# Patient Record
Sex: FEMALE | Race: ASIAN | URBAN - METROPOLITAN AREA
[De-identification: names, ages, dates, MRNs, and addresses within clinical notes are randomized per-mention and may not be internally consistent; named-entity substitution may affect disease eponyms.]

---

## 2020-01-22 ENCOUNTER — NEW PATIENT (OUTPATIENT)
Dept: URBAN - METROPOLITAN AREA CLINIC 30 | Facility: CLINIC | Age: 70
End: 2020-01-22
Payer: MEDICARE

## 2020-01-22 DIAGNOSIS — H04.123 TEAR FILM INSUFFICIENCY OF BILATERAL LACRIMAL GLANDS: ICD-10-CM

## 2020-01-22 DIAGNOSIS — H25.813 COMBINED FORMS OF AGE-RELATED CATARACT, BILATERAL: ICD-10-CM

## 2020-01-22 PROCEDURE — 92134 CPTRZ OPH DX IMG PST SGM RTA: CPT | Performed by: OPTOMETRIST

## 2020-01-22 PROCEDURE — 92004 COMPRE OPH EXAM NEW PT 1/>: CPT | Performed by: OPTOMETRIST

## 2020-01-22 ASSESSMENT — INTRAOCULAR PRESSURE
OD: 22
OS: 22

## 2020-01-22 ASSESSMENT — VISUAL ACUITY
OS: 20/40
OD: 20/40

## 2020-01-22 ASSESSMENT — KERATOMETRY
OS: 42.43
OD: 42.80

## 2020-03-02 ENCOUNTER — FOLLOW UP ESTABLISHED (OUTPATIENT)
Dept: URBAN - METROPOLITAN AREA CLINIC 30 | Facility: CLINIC | Age: 70
End: 2020-03-02
Payer: MEDICARE

## 2020-03-02 PROCEDURE — 99213 OFFICE O/P EST LOW 20 MIN: CPT | Performed by: OPTOMETRIST

## 2020-03-02 PROCEDURE — 76514 ECHO EXAM OF EYE THICKNESS: CPT | Performed by: OPTOMETRIST

## 2020-03-02 PROCEDURE — 92083 EXTENDED VISUAL FIELD XM: CPT | Performed by: OPTOMETRIST

## 2020-03-02 PROCEDURE — 92133 CPTRZD OPH DX IMG PST SGM ON: CPT | Performed by: OPTOMETRIST

## 2020-03-02 ASSESSMENT — VISUAL ACUITY
OD: 20/40
OS: 20/30

## 2020-03-02 ASSESSMENT — INTRAOCULAR PRESSURE
OD: 18
OS: 17

## 2021-08-26 ENCOUNTER — OFFICE VISIT (OUTPATIENT)
Dept: URBAN - METROPOLITAN AREA CLINIC 30 | Facility: CLINIC | Age: 71
End: 2021-08-26
Payer: MEDICARE

## 2021-08-26 DIAGNOSIS — E11.9 TYPE 2 DIABETES MELLITUS WITHOUT COMPLICATIONS: Primary | ICD-10-CM

## 2021-08-26 DIAGNOSIS — H40.023 OPEN ANGLE WITH BORDERLINE FINDINGS, HIGH RISK, BILATERAL: ICD-10-CM

## 2021-08-26 PROCEDURE — 92133 CPTRZD OPH DX IMG PST SGM ON: CPT | Performed by: OPTOMETRIST

## 2021-08-26 PROCEDURE — 92134 CPTRZ OPH DX IMG PST SGM RTA: CPT | Performed by: OPTOMETRIST

## 2021-08-26 PROCEDURE — 92014 COMPRE OPH EXAM EST PT 1/>: CPT | Performed by: OPTOMETRIST

## 2021-08-26 ASSESSMENT — VISUAL ACUITY
OS: 20/25
OD: 20/30

## 2021-08-26 ASSESSMENT — INTRAOCULAR PRESSURE
OS: 20
OD: 20

## 2021-08-26 ASSESSMENT — KERATOMETRY
OS: 42.86
OD: 23.02

## 2021-08-26 NOTE — IMPRESSION/PLAN
Impression: Combined forms of age-related cataract, bilateral Plan: Appears stable. CTM for now. New spec Rx today.

## 2021-08-26 NOTE — IMPRESSION/PLAN
Impression: Type 2 diabetes mellitus without complications Plan: No retinopathy on exam today. Patient educated on importance of BG control. Monitor annually. Diet controlled.

## 2021-08-26 NOTE — IMPRESSION/PLAN
Impression: Open angle with borderline findings, high risk, bilateral
-FHx PACHS 533/539 Plan: Due to mild cupping. IOP a little higher today, 20 OU s tx. RNFL 3/2020 normal NFL OU (88/82); RNFL 8/21 essentially stable. VF 3/2020 essentially full OU c low test reliability. Monitor s tx at this time. Repeat VF in future.

## 2021-08-26 NOTE — IMPRESSION/PLAN
Impression: Tear film insufficiency of bilateral lacrimal glands Plan: Pt notes intermittent blurring of vision. Pt does not use ATs consistently noting she forgets.

## 2022-02-24 ENCOUNTER — OFFICE VISIT (OUTPATIENT)
Dept: URBAN - METROPOLITAN AREA CLINIC 30 | Facility: CLINIC | Age: 72
End: 2022-02-24
Payer: MEDICARE

## 2022-02-24 PROCEDURE — 92083 EXTENDED VISUAL FIELD XM: CPT | Performed by: OPTOMETRIST

## 2022-02-24 PROCEDURE — 99213 OFFICE O/P EST LOW 20 MIN: CPT | Performed by: OPTOMETRIST

## 2022-02-24 ASSESSMENT — INTRAOCULAR PRESSURE
OS: 20
OD: 20

## 2022-02-24 NOTE — IMPRESSION/PLAN
Impression: Open angle with borderline findings, high risk, bilateral
-FHx PACHS 533/539 Plan: Due to mild cupping. IOPs stable, 20 OU s tx. RNFL 3/2020 normal NFL OU (88/82); RNFL 8/21 essentially stable. VF 3/2020 essentially full OU c low test reliability. VF 2/2022: unreliable OU/full. VFs have been unreliable, IOP stable and normal OCT, monitor s tx. Consider annual monitoring after next visit.

## 2022-08-24 ENCOUNTER — OFFICE VISIT (OUTPATIENT)
Dept: URBAN - METROPOLITAN AREA CLINIC 30 | Facility: CLINIC | Age: 72
End: 2022-08-24
Payer: MEDICARE

## 2022-08-24 DIAGNOSIS — H40.023 OPEN ANGLE WITH BORDERLINE FINDINGS, HIGH RISK, BILATERAL: ICD-10-CM

## 2022-08-24 DIAGNOSIS — E11.9 TYPE 2 DIABETES MELLITUS WITHOUT COMPLICATIONS: Primary | ICD-10-CM

## 2022-08-24 DIAGNOSIS — H04.123 DRY EYE SYNDROME OF BILATERAL LACRIMAL GLANDS: ICD-10-CM

## 2022-08-24 DIAGNOSIS — Z79.84 LONG TERM USE OF ORAL ANTIDIABETIC DRUG: ICD-10-CM

## 2022-08-24 DIAGNOSIS — H25.813 COMBINED FORMS OF AGE-RELATED CATARACT, BILATERAL: ICD-10-CM

## 2022-08-24 PROCEDURE — 92134 CPTRZ OPH DX IMG PST SGM RTA: CPT | Performed by: OPTOMETRIST

## 2022-08-24 PROCEDURE — 92133 CPTRZD OPH DX IMG PST SGM ON: CPT | Performed by: OPTOMETRIST

## 2022-08-24 PROCEDURE — 92014 COMPRE OPH EXAM EST PT 1/>: CPT | Performed by: OPTOMETRIST

## 2022-08-24 ASSESSMENT — VISUAL ACUITY
OD: 20/40
OS: 20/40

## 2022-08-24 ASSESSMENT — INTRAOCULAR PRESSURE
OD: 17
OS: 19

## 2022-08-24 NOTE — IMPRESSION/PLAN
Impression: Open angle with borderline findings, high risk, bilateral
-FHx PACHS 533/539 Plan: Due to mild cupping. IOPs today, 17,19) s tx. RNFL 3/2020 normal NFL OU (88/82); RNFL 8/21 essentially stable. RNFL 8/2022: normal NFL OU, appears stable. VF 3/2020 essentially full OU c low test reliability. VF 2/2022: unreliable OU/full. VFs have been unreliable, IOP stable and normal OCT, monitor s tx. Plan  annual monitoring at this point.

## 2022-08-24 NOTE — IMPRESSION/PLAN
Impression: Tear film insufficiency of bilateral lacrimal glands Plan: Patient notes she is not using ATs consistently due to forgetting. Pt intermittent blurring of visions especially when looking at her cell phone. Discussed blink breaks during screen time and use ATs consistently QAM increasing throughout the day up to QID OU prn.

## 2022-08-24 NOTE — IMPRESSION/PLAN
Impression: Combined forms of age-related cataract, bilateral Plan: Discussed cataracts with patient. Discussed treatment options. Surgical treatment is recommended. Pt defers surgery at this time due to other health issues, needs sinus surgery.

## 2023-01-31 ENCOUNTER — OFFICE VISIT (OUTPATIENT)
Dept: URBAN - METROPOLITAN AREA CLINIC 30 | Facility: CLINIC | Age: 73
End: 2023-01-31
Payer: MEDICARE

## 2023-01-31 DIAGNOSIS — H02.821 CYSTS OF RIGHT UPPER EYELID: Primary | ICD-10-CM

## 2023-01-31 DIAGNOSIS — H25.813 COMBINED FORMS OF AGE-RELATED CATARACT, BILATERAL: ICD-10-CM

## 2023-01-31 PROCEDURE — 99213 OFFICE O/P EST LOW 20 MIN: CPT | Performed by: OPTOMETRIST

## 2023-01-31 ASSESSMENT — INTRAOCULAR PRESSURE
OS: 20
OD: 18

## 2023-01-31 NOTE — IMPRESSION/PLAN
Impression: Cysts of right upper eyelid: H02.821. Plan: Appears to be a small internal cyst RUL (palpable mass, not visible on exam), everted UL- no FB and does not appear to be hordeola or chalazia. Rec try WCP and gentle lid massage, if lesion enlarges may need biopsy. No pain. Call if worsens.

## 2023-01-31 NOTE — IMPRESSION/PLAN
Impression: Combined forms of age-related cataract, bilateral Plan: Discussed cataracts with patient. Pt still defers surgery at this time due to other health issues.

## 2023-08-24 ENCOUNTER — OFFICE VISIT (OUTPATIENT)
Dept: URBAN - METROPOLITAN AREA CLINIC 30 | Facility: CLINIC | Age: 73
End: 2023-08-24
Payer: MEDICARE

## 2023-08-24 DIAGNOSIS — H25.813 COMBINED FORMS OF AGE-RELATED CATARACT, BILATERAL: ICD-10-CM

## 2023-08-24 DIAGNOSIS — E11.9 TYPE 2 DIABETES MELLITUS WITHOUT COMPLICATIONS: Primary | ICD-10-CM

## 2023-08-24 DIAGNOSIS — H11.153 PINGUECULA, BILATERAL: ICD-10-CM

## 2023-08-24 DIAGNOSIS — Z79.84 LONG TERM USE OF ORAL ANTIDIABETIC DRUG: ICD-10-CM

## 2023-08-24 DIAGNOSIS — H40.023 OPEN ANGLE WITH BORDERLINE FINDINGS, HIGH RISK, BILATERAL: ICD-10-CM

## 2023-08-24 DIAGNOSIS — H04.123 DRY EYE SYNDROME OF BILATERAL LACRIMAL GLANDS: ICD-10-CM

## 2023-08-24 PROCEDURE — 92014 COMPRE OPH EXAM EST PT 1/>: CPT | Performed by: OPTOMETRIST

## 2023-08-24 PROCEDURE — 92133 CPTRZD OPH DX IMG PST SGM ON: CPT | Performed by: OPTOMETRIST

## 2023-08-24 ASSESSMENT — INTRAOCULAR PRESSURE
OS: 23
OD: 21

## 2023-08-24 ASSESSMENT — KERATOMETRY
OD: 42.83
OS: 42.75

## 2023-08-24 ASSESSMENT — VISUAL ACUITY
OD: 20/30
OS: 20/30

## 2023-12-22 ENCOUNTER — OFFICE VISIT (OUTPATIENT)
Dept: URBAN - METROPOLITAN AREA CLINIC 30 | Facility: CLINIC | Age: 73
End: 2023-12-22
Payer: MEDICARE

## 2023-12-22 DIAGNOSIS — H40.023 OPEN ANGLE WITH BORDERLINE FINDINGS, HIGH RISK, BILATERAL: Primary | ICD-10-CM

## 2023-12-22 PROCEDURE — 99212 OFFICE O/P EST SF 10 MIN: CPT | Performed by: OPTOMETRIST

## 2023-12-22 PROCEDURE — 92083 EXTENDED VISUAL FIELD XM: CPT | Performed by: OPTOMETRIST

## 2023-12-22 ASSESSMENT — INTRAOCULAR PRESSURE
OD: 21
OS: 20

## 2024-08-23 ENCOUNTER — OFFICE VISIT (OUTPATIENT)
Dept: URBAN - METROPOLITAN AREA CLINIC 30 | Facility: CLINIC | Age: 74
End: 2024-08-23
Payer: MEDICARE

## 2024-08-23 DIAGNOSIS — Z79.84 LONG TERM USE OF ORAL ANTIDIABETIC DRUG: ICD-10-CM

## 2024-08-23 DIAGNOSIS — H40.023 OPEN ANGLE WITH BORDERLINE FINDINGS, HIGH RISK, BILATERAL: ICD-10-CM

## 2024-08-23 DIAGNOSIS — H04.123 DRY EYE SYNDROME OF BILATERAL LACRIMAL GLANDS: ICD-10-CM

## 2024-08-23 DIAGNOSIS — E11.9 TYPE 2 DIABETES MELLITUS WITHOUT COMPLICATIONS: Primary | ICD-10-CM

## 2024-08-23 DIAGNOSIS — H25.813 COMBINED FORMS OF AGE-RELATED CATARACT, BILATERAL: ICD-10-CM

## 2024-08-23 PROCEDURE — 92014 COMPRE OPH EXAM EST PT 1/>: CPT | Performed by: OPTOMETRIST

## 2024-08-23 PROCEDURE — 92134 CPTRZ OPH DX IMG PST SGM RTA: CPT | Performed by: OPTOMETRIST

## 2024-08-23 PROCEDURE — 92133 CPTRZD OPH DX IMG PST SGM ON: CPT | Performed by: OPTOMETRIST

## 2024-08-23 ASSESSMENT — KERATOMETRY
OD: 42.97
OS: 42.70

## 2024-08-23 ASSESSMENT — VISUAL ACUITY
OS: 20/40
OD: 20/30

## 2024-08-23 ASSESSMENT — INTRAOCULAR PRESSURE
OD: 21
OS: 20

## 2025-08-25 ENCOUNTER — OFFICE VISIT (OUTPATIENT)
Dept: URBAN - METROPOLITAN AREA CLINIC 30 | Facility: CLINIC | Age: 75
End: 2025-08-25
Payer: MEDICARE

## 2025-08-25 DIAGNOSIS — H43.811 VITREOUS DEGENERATION, RIGHT EYE: ICD-10-CM

## 2025-08-25 DIAGNOSIS — H25.813 COMBINED FORMS OF AGE-RELATED CATARACT, BILATERAL: ICD-10-CM

## 2025-08-25 DIAGNOSIS — E11.9 TYPE 2 DIABETES MELLITUS WITHOUT COMPLICATIONS: Primary | ICD-10-CM

## 2025-08-25 DIAGNOSIS — H40.023 OPEN ANGLE WITH BORDERLINE FINDINGS, HIGH RISK, BILATERAL: ICD-10-CM

## 2025-08-25 DIAGNOSIS — Z79.84 LONG TERM USE OF ORAL ANTIDIABETIC DRUG: ICD-10-CM

## 2025-08-25 PROCEDURE — 92014 COMPRE OPH EXAM EST PT 1/>: CPT | Performed by: OPTOMETRIST

## 2025-08-25 PROCEDURE — 92134 CPTRZ OPH DX IMG PST SGM RTA: CPT | Performed by: OPTOMETRIST

## 2025-08-25 PROCEDURE — 92133 CPTRZD OPH DX IMG PST SGM ON: CPT | Performed by: OPTOMETRIST

## 2025-08-25 ASSESSMENT — KERATOMETRY
OD: 43.25
OS: 42.88

## 2025-08-25 ASSESSMENT — INTRAOCULAR PRESSURE
OD: 19
OS: 20

## 2025-08-25 ASSESSMENT — VISUAL ACUITY
OS: 20/20
OD: 20/30